# Patient Record
Sex: FEMALE | Race: WHITE | NOT HISPANIC OR LATINO | URBAN - NONMETROPOLITAN AREA
[De-identification: names, ages, dates, MRNs, and addresses within clinical notes are randomized per-mention and may not be internally consistent; named-entity substitution may affect disease eponyms.]

---

## 2018-05-31 PROBLEM — H52.4: Noted: 2018-05-31

## 2018-05-31 PROBLEM — Z96.1: Noted: 2018-05-31

## 2019-01-29 NOTE — PATIENT DISCUSSION
Advised to take Omega 3 fatty acids, Flaxseed Oil, in supplements. 2-4 grams a day. Recommended warm compresses 10 minutes once a day.

## 2020-03-18 ENCOUNTER — IMPORTED ENCOUNTER (OUTPATIENT)
Dept: URBAN - NONMETROPOLITAN AREA CLINIC 1 | Facility: CLINIC | Age: 80
End: 2020-03-18

## 2020-03-18 PROCEDURE — 92014 COMPRE OPH EXAM EST PT 1/>: CPT

## 2020-03-18 PROCEDURE — 92015 DETERMINE REFRACTIVE STATE: CPT

## 2020-03-18 NOTE — PATIENT DISCUSSION
Pseudophakia OUDiscussed diagnosis in detail with patient. Both intraocular implants in place and stable. Continue to monitor. Presbyopia OUDiscussed refractive status in detail with patient. New glasses Rx given today. Continue to monitor.

## 2022-04-09 ASSESSMENT — VISUAL ACUITY
OS_SC: 20/20
OD_SC: 20/20

## 2022-04-09 ASSESSMENT — TONOMETRY
OD_IOP_MMHG: 16
OS_IOP_MMHG: 18